# Patient Record
Sex: MALE | Race: WHITE | Employment: FULL TIME | URBAN - METROPOLITAN AREA
[De-identification: names, ages, dates, MRNs, and addresses within clinical notes are randomized per-mention and may not be internally consistent; named-entity substitution may affect disease eponyms.]

---

## 2016-10-17 LAB
CHOLEST SERPL-MCNC: 254 MG/DL (ref 0–199)
CHOLEST/HDLC SERPL: 4.3 {RATIO}
HDLC SERPL-MCNC: 59 MG/DL
LDLC SERPL CALC-MCNC: 163 MG/DL (ref 19–130)
NONHDLC SERPL-MCNC: ABNORMAL MG/DL
TRIGL SERPL-MCNC: 158 MG/DL (ref 4–149)

## 2017-03-20 ENCOUNTER — TRANSFERRED RECORDS (OUTPATIENT)
Dept: CARDIOLOGY | Facility: CLINIC | Age: 62
End: 2017-03-20

## 2017-03-29 ENCOUNTER — TRANSFERRED RECORDS (OUTPATIENT)
Dept: CARDIOLOGY | Facility: CLINIC | Age: 62
End: 2017-03-29

## 2017-03-31 ENCOUNTER — TELEPHONE (OUTPATIENT)
Dept: CARDIOLOGY | Facility: CLINIC | Age: 62
End: 2017-03-31

## 2017-03-31 DIAGNOSIS — I25.10 CORONARY ARTERY DISEASE INVOLVING NATIVE CORONARY ARTERY OF NATIVE HEART WITHOUT ANGINA PECTORIS: Primary | ICD-10-CM

## 2017-03-31 NOTE — TELEPHONE ENCOUNTER
Call back from patient, he found the CT report, CT calcium total=400's. He will fax to nurse fax machine.

## 2017-03-31 NOTE — TELEPHONE ENCOUNTER
Received copy of CT report, copy sent to scan. Per note on report, patient's lipitor was increased to 40mg daily.

## 2017-04-05 ENCOUNTER — TELEPHONE (OUTPATIENT)
Dept: CARDIOLOGY | Facility: CLINIC | Age: 62
End: 2017-04-05

## 2017-04-05 NOTE — TELEPHONE ENCOUNTER
Patient called wondering if there was an appointment available with Dr. House either of 4-12-17 or 4-13-17. Paitent will call back early next week. Dr. House out of the office until then.

## 2017-04-10 PROBLEM — I25.10 CORONARY ARTERY DISEASE INVOLVING NATIVE CORONARY ARTERY OF NATIVE HEART WITHOUT ANGINA PECTORIS: Status: ACTIVE | Noted: 2017-04-10

## 2017-04-10 NOTE — TELEPHONE ENCOUNTER
Spoke with patient, tentatively scheduled for 4/14/17 at 4:30 with Dr. Oconnor, patient is aware he may have to be canceled due to emergent cases in the cath lab.

## 2017-04-10 NOTE — TELEPHONE ENCOUNTER
Contacted patient, he states he will start aspirin 81mg daily. He would like to set up the stress echo while he is here in [place] this week and then hopefully work in to Dr. House's schedule.  Order placed for stress echo and patient connected to scheduling.

## 2017-04-11 ENCOUNTER — TELEPHONE (OUTPATIENT)
Dept: CARDIOLOGY | Facility: CLINIC | Age: 62
End: 2017-04-11

## 2017-04-11 ENCOUNTER — PRE VISIT (OUTPATIENT)
Dept: CARDIOLOGY | Facility: CLINIC | Age: 62
End: 2017-04-11

## 2017-04-11 NOTE — TELEPHONE ENCOUNTER
Patient called, he is scheduled for a stress echo 4-13-17 and a OV with Dr. House 4-14-17. Patient states that due to his kidney stone problems he is on double dose Flomax and percocet frequently.   Patient wonders if either of these medications would affect the results of the stress echocardiogram. Patient states he feels he can walk on the treadmill.   Will update Dr. House.

## 2017-04-12 NOTE — TELEPHONE ENCOUNTER
Received office note 3/21/17 and labs from [facility], labs sent to be entered, records sent to scan

## 2017-04-13 ENCOUNTER — HOSPITAL ENCOUNTER (OUTPATIENT)
Dept: CARDIOLOGY | Facility: CLINIC | Age: 62
Discharge: HOME | End: 2017-04-13
Attending: INTERNAL MEDICINE | Admitting: INTERNAL MEDICINE
Payer: COMMERCIAL

## 2017-04-13 DIAGNOSIS — I25.10 CORONARY ARTERY DISEASE INVOLVING NATIVE CORONARY ARTERY OF NATIVE HEART WITHOUT ANGINA PECTORIS: ICD-10-CM

## 2017-04-13 PROCEDURE — 93350 STRESS TTE ONLY: CPT | Mod: 26 | Performed by: INTERNAL MEDICINE

## 2017-04-13 PROCEDURE — 93016 CV STRESS TEST SUPVJ ONLY: CPT | Performed by: INTERNAL MEDICINE

## 2017-04-13 PROCEDURE — 93018 CV STRESS TEST I&R ONLY: CPT | Performed by: INTERNAL MEDICINE

## 2017-04-13 PROCEDURE — 93321 DOPPLER ECHO F-UP/LMTD STD: CPT | Mod: 26 | Performed by: INTERNAL MEDICINE

## 2017-04-13 PROCEDURE — 93325 DOPPLER ECHO COLOR FLOW MAPG: CPT | Mod: 26 | Performed by: INTERNAL MEDICINE

## 2017-04-13 PROCEDURE — 93325 DOPPLER ECHO COLOR FLOW MAPG: CPT | Mod: TC

## 2017-04-13 NOTE — TELEPHONE ENCOUNTER
Call from Juan at [facility], [phone]. She states patient's insurance will not pay for today's stress test without a [id number] call from Dr. House. Case # [id number], Phone [phone].   Will message Dr. House to review  Juan would like a call back with an update if this call has been made.

## 2017-04-13 NOTE — TELEPHONE ENCOUNTER
Call from patient, he now has a family emergency and needs to go to [facility]. His father and brother have both been admitted for care and his mother is an invalid needing supervision. Patient is hoping he can discuss any urgent matters by phone tomorrow instead of  his planned 4:30 appointment and will be back in [place] next week TWTh if any time there would be open to see Dr. House.  Will message Dr. House to review

## 2017-04-14 DIAGNOSIS — I25.10 CORONARY ARTERY DISEASE INVOLVING NATIVE CORONARY ARTERY OF NATIVE HEART WITHOUT ANGINA PECTORIS: Primary | ICD-10-CM

## 2017-04-14 NOTE — TELEPHONE ENCOUNTER
I called the 888 number. [facility] was approved. I left a mssage on Juan Griffin's message machine

## 2017-04-14 NOTE — TELEPHONE ENCOUNTER
I called Adan. He will be back in the [facility] on Wednesday. I told him I would schedule him for 4PM ov as long as cath lab is not unreasonable. Please try to get Ca Score. Thanks

## 2017-04-19 ENCOUNTER — OFFICE VISIT (OUTPATIENT)
Dept: CARDIOLOGY | Facility: CLINIC | Age: 62
End: 2017-04-19
Attending: INTERNAL MEDICINE
Payer: COMMERCIAL

## 2017-04-19 VITALS
SYSTOLIC BLOOD PRESSURE: 132 MMHG | HEIGHT: 70 IN | WEIGHT: 192.2 LBS | DIASTOLIC BLOOD PRESSURE: 84 MMHG | BODY MASS INDEX: 27.52 KG/M2 | HEART RATE: 75 BPM

## 2017-04-19 DIAGNOSIS — E78.00 PURE HYPERCHOLESTEROLEMIA: Primary | ICD-10-CM

## 2017-04-19 DIAGNOSIS — I25.10 CORONARY ARTERY DISEASE INVOLVING NATIVE CORONARY ARTERY OF NATIVE HEART WITHOUT ANGINA PECTORIS: ICD-10-CM

## 2017-04-19 PROCEDURE — 99203 OFFICE O/P NEW LOW 30 MIN: CPT | Performed by: INTERNAL MEDICINE

## 2017-04-19 PROCEDURE — 93000 ELECTROCARDIOGRAM COMPLETE: CPT | Performed by: INTERNAL MEDICINE

## 2017-04-19 RX ORDER — ROSUVASTATIN CALCIUM 40 MG/1
40 TABLET, COATED ORAL DAILY
Qty: 90 TABLET | Refills: 3 | Status: SHIPPED | OUTPATIENT
Start: 2017-04-19 | End: 2017-06-08

## 2017-04-19 NOTE — LETTER
4/19/2017    ALEKSANDAR JIM  [facility]   [address]  [place] [address]    RE: Zack Alas       Dear Colleague,    I had the pleasure of seeing Zack Alas in the [facility].    Adan is an ex-neighbor of mine.  He actually moved out of my neighborhood in 2003 to [place], [place] but he still comes back for his health care.  He has a very strong family history of coronary artery disease.  He has hypercholesterolemia and recently had a kidney stone.  A CT scan to evaluate his kidney stone noted that he had some calcium building up around his coronaries.  Because of that, he underwent a calcium score through [facility] with a total score of 482 putting him in the 90th-95th percentile for a man of his age.  Most of his calcium was in his left anterior descending artery, right coronary artery and left main.  His doctor recommended a stress echocardiogram to evaluate further and he decided to call me as he knew me from years ago.      Adan has no exertional chest, arm, neck, jaw or shoulder discomfort, no dyspnea on exertion, orthopnea or PND, no palpitations, lightheadedness, dizziness, syncope or near-syncope.  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  He states he has not been doing as well recently but plans to get back with his  and start working out much more vigorously again.  We did perform a stress echocardiogram for which he was able to exercise 9 minutes.  He was asymptomatic.  There were no EKG changes and the echo portion appeared to be normal.  He did have some proximal septal thickening but no dynamic outflow tract gradient.  His baseline EKG demonstrates a normal sinus rhythm.  He has a RSR pattern in V1 but does not appear to have a right bundle branch block.     Outpatient Encounter Prescriptions as of 4/19/2017   Medication Sig Dispense Refill     [DISCONTINUED] rosuvastatin (CRESTOR) 40 MG  tablet Take 1 tablet (40 mg) by mouth daily 90 tablet 3     ASPIRIN ADULT LOW STRENGTH PO Take 81 mg by mouth daily       [DISCONTINUED] Atorvastatin Calcium (LIPITOR PO) Take 40 mg by mouth daily       No facility-administered encounter medications on file as of 4/19/2017.       ASSESSMENT AND PLAN:  Zack appears to be doing well from a cardiac standpoint without clinical evidence of ischemia, heart failure or significant arrhythmia.  Clearly with his calcium score, he does have underlying brewing coronary artery disease but this time none of it appears to be hemodynamically significant.   I talked about the importance of a very aggressive risk factor modification and preventive protocol.  We will start out with 81 mg of aspirin 1 pill once a day.      Fasting lipid profile shows a total cholesterol of 254, triglycerides are 158, HDL is 59, LDL is 163.  He is currently on atorvastatin 40.  I have told him we should just go to the most powerful statin and switched him to rosuvastatin 40.  I would like to recheck a fasting lipid profile and liver function tests in 4-6 weeks.  We talked about potential side effects of the statins and potential benefits of the statins.      Blood pressure right now is very well controlled at 132/84.  Although it is not ideal, I am not going to start him on medications right now.      We talked about the importance of nonmedicinal things to help decrease his risk for vascular disease including daily exercise, predominantly a plant-based diet, high in fresh fruits and vegetables, high fiber, low in red meat and low in dairy products, low in processed food.  He asked about what books to read and I have recommended Naga Forrester's book and Najma's book, How Not To Die.  I will get more recommendations for him.        We will plan on visiting again in about six months.  If he should have any problems, I would be glad to see him sooner.     Again, thank you for allowing me to participate in  the care of your patient.      Sincerely,    Swapnil House MD     [facility]

## 2017-04-19 NOTE — MR AVS SNAPSHOT
After Visit Summary   4/19/2017    Zack Alas    MRN: [id number]           Patient Information     Date Of Birth          1955        Visit Information        Provider Department      4/19/2017 4:00 PM Swapnil House MD [facility]        Today's Diagnoses     Pure hypercholesterolemia    -  1    Coronary artery disease involving native coronary artery of native heart without angina pectoris           Follow-ups after your visit        Additional Services     Follow-Up with Cardiologist                 Future tests that were ordered for you today     Open Future Orders        Priority Expected Expires Ordered    Lipid Profile Routine 10/16/2017 4/19/2018 4/19/2017    Follow-Up with Cardiologist Routine 10/16/2017 4/19/2018 4/19/2017    Lipid Profile Routine 5/19/2017 4/19/2018 4/19/2017    ALT Routine 5/19/2017 4/19/2018 4/19/2017            Who to contact     If you have questions or need follow up information about today's clinic visit or your schedule please contact [facility] directly at [phone].  Normal or non-critical lab and imaging results will be communicated to you by [employer], letter or phone within 4 business days after the clinic has received the results. If you do not hear from us within 7 days, please contact the clinic through N[facility] or phone. If you have a critical or abnormal lab result, we will notify you by phone as soon as possible.  Submit refill requests through [facility] or call your pharmacy and they will forward the refill request to us. Please allow 3 business days for your refill to be completed.          Additional Information About Your Visit        Aunt [employer] Information     [facility] gives you secure access to your electronic health record. If you see a primary care provider, you can also send messages to your care team and make appointments. If you have questions, please  "call your primary care clinic.  If you do not have a primary care provider, please call [phone] and they will assist you.        Care EveryWhere ID     This is your Care EveryWhere ID. This could be used by other organizations to access your [facility] medical records  [id number]        Your Vitals Were     Pulse Height BMI (Body Mass Index)             75 1.778 m (5' 10\") 27.58 kg/m2          Blood Pressure from Last 3 Encounters:   04/19/17 132/84    Weight from Last 3 Encounters:   04/19/17 87.2 kg (192 lb 3.2 oz)              We Performed the Following     EKG 12-lead complete w/read - Clinics (performed today)     Follow-Up with Cardiologist          Today's Medication Changes          These changes are accurate as of: 4/19/17  5:00 PM.  If you have any questions, ask your nurse or doctor.               Start taking these medicines.        Dose/Directions    rosuvastatin 40 MG tablet   Commonly known as:  CRESTOR   Used for:  Coronary artery disease involving native coronary artery of native heart without angina pectoris   Started by:  Swapnil House MD        Dose:  40 mg   Take 1 tablet (40 mg) by mouth daily   Quantity:  90 tablet   Refills:  3         Stop taking these medicines if you haven't already. Please contact your care team if you have questions.     LIPITOR PO   Stopped by:  Swapnil House MD                Where to get your medicines      These medications were sent to [id number] - [place], [place] - [address], [place] [address]     Phone:  [phone]     rosuvastatin 40 MG tablet                Primary Care Provider Office Phone # Fax #    Wei WOODS Hayder [phone]       [facility] [address]  [facility] [place] [facility] [address]        Thank you!     Thank you for choosing [facility]  for your care. Our goal is always to provide you with " excellent care. Hearing back from our patients is one way we can continue to improve our services. Please take a few minutes to complete the written survey that you may receive in the mail after your visit with us. Thank you!             Your Updated Medication List - Protect others around you: Learn how to safely use, store and throw away your medicines at www.disposemymeds.org.          This list is accurate as of: 4/19/17  5:00 PM.  Always use your most recent med list.                   Brand Name Dispense Instructions for use    ASPIRIN ADULT LOW STRENGTH PO      Take 81 mg by mouth daily       rosuvastatin 40 MG tablet    CRESTOR    90 tablet    Take 1 tablet (40 mg) by mouth daily

## 2017-04-19 NOTE — PROGRESS NOTES
HPI and Plan:   See dictation    Orders Placed This Encounter   Procedures     Lipid Profile     ALT     Lipid Profile     Follow-Up with Cardiologist     EKG 12-lead complete w/read - Clinics (performed today)       Orders Placed This Encounter   Medications     rosuvastatin (CRESTOR) 40 MG tablet     Sig: Take 1 tablet (40 mg) by mouth daily     Dispense:  90 tablet     Refill:  3       Medications Discontinued During This Encounter   Medication Reason     Atorvastatin Calcium (LIPITOR PO)          Encounter Diagnoses   Name Primary?     Coronary artery disease involving native coronary artery of native heart without angina pectoris      Pure hypercholesterolemia Yes       CURRENT MEDICATIONS:  Current Outpatient Prescriptions   Medication Sig Dispense Refill     rosuvastatin (CRESTOR) 40 MG tablet Take 1 tablet (40 mg) by mouth daily 90 tablet 3     ASPIRIN ADULT LOW STRENGTH PO Take 81 mg by mouth daily         ALLERGIES   No Known Allergies    PAST MEDICAL HISTORY:  History reviewed. No pertinent past medical history.    PAST SURGICAL HISTORY:  History reviewed. No pertinent surgical history.    FAMILY HISTORY:  Family History   Problem Relation Age of Onset     DIABETES Mother      Chronic Obstructive Pulmonary Disease Mother      Hypertension Father      Prostate Cancer Father      Kidney Cancer Father      Bladder Cancer Father        SOCIAL HISTORY:  Social History     Social History     Marital status:      Spouse name: N/A     Number of children: N/A     Years of education: N/A     Social History Main Topics     Smoking status: Never Smoker     Smokeless tobacco: None     Alcohol use Yes     Drug use: None     Sexual activity: Not Asked     Other Topics Concern     None     Social History Narrative     None       Review of Systems:  Skin:  Negative       Eyes:  Positive for glasses    ENT:  Negative      Respiratory:  Negative       Cardiovascular:  Negative      Gastroenterology: Negative     "  Genitourinary:  not assessed      Musculoskeletal:  Negative      Neurologic:  Negative      Psychiatric:  Negative      Heme/Lymph/Imm:  Negative      Endocrine:  Negative        Physical Exam:  Vitals: /84  Pulse 75  Ht 1.778 m (5' 10\")  Wt 87.2 kg (192 lb 3.2 oz)  BMI 27.58 kg/m2    Constitutional:  cooperative, alert and oriented, well developed, well nourished, in no acute distress        Skin:  warm and dry to the touch, no apparent skin lesions or masses noted        Head:  normocephalic, no masses or lesions        Eyes:  pupils equal and round, conjunctivae and lids unremarkable, sclera white, no xanthalasma, EOMS intact, no nystagmus        ENT:  no pallor or cyanosis, dentition good        Neck:  carotid pulses are full and equal bilaterally;no carotid bruit        Chest:             Cardiac: regular rhythm;normal S1 and S2;no S3 or S4;no murmurs, gallops or rubs detected                  Abdomen:           Vascular: pulses full and equal                                        Extremities and Back:  no edema;no spinal abnormalities noted;normal muscle strength and tone              Neurological:  affect appropriate, oriented to time, person and place;no gross motor deficits              CC  Swapnil House MD  [facility]  [address]  [place] [address]              "

## 2017-04-20 NOTE — PROGRESS NOTES
HISTORY OF PRESENT ILLNESS:  Adan is an ex-neighbor of mine.  He actually moved out of my neighborhood in 2003 to [place], [place] but he still comes back for his health care.  He has a very strong family history of coronary artery disease.  He has hypercholesterolemia and recently had a kidney stone.  A CT scan to evaluate his kidney stone noted that he had some calcium building up around his coronaries.  Because of that, he underwent a calcium score through [facility] with a total score of 482 putting him in the 90th-95th percentile for a man of his age.  Most of his calcium was in his left anterior descending artery, right coronary artery and left main.  His doctor recommended a stress echocardiogram to evaluate further and he decided to call me as he knew me from years ago.      Adan has no exertional chest, arm, neck, jaw or shoulder discomfort, no dyspnea on exertion, orthopnea or PND, no palpitations, lightheadedness, dizziness, syncope or near-syncope.  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  He states he has not been doing as well recently but plans to get back with his  and start working out much more vigorously again.  We did perform a stress echocardiogram for which he was able to exercise 9 minutes.  He was asymptomatic.  There were no EKG changes and the echo portion appeared to be normal.  He did have some proximal septal thickening but no dynamic outflow tract gradient.  His baseline EKG demonstrates a normal sinus rhythm.  He has a RSR pattern in V1 but does not appear to have a right bundle branch block.      ASSESSMENT AND PLAN:  Zack appears to be doing well from a cardiac standpoint without clinical evidence of ischemia, heart failure or significant arrhythmia.  Clearly with his calcium score, he does have underlying brewing coronary artery disease but this time none of it appears to be hemodynamically significant.   I talked about the importance of a  very aggressive risk factor modification and preventive protocol.  We will start out with 81 mg of aspirin 1 pill once a day.      Fasting lipid profile shows a total cholesterol of 254, triglycerides are 158, HDL is 59, LDL is 163.  He is currently on atorvastatin 40.  I have told him we should just go to the most powerful statin and switched him to rosuvastatin 40.  I would like to recheck a fasting lipid profile and liver function tests in 4-6 weeks.  We talked about potential side effects of the statins and potential benefits of the statins.      Blood pressure right now is very well controlled at 132/84.  Although it is not ideal, I am not going to start him on medications right now.      We talked about the importance of nonmedicinal things to help decrease his risk for vascular disease including daily exercise, predominantly a plant-based diet, high in fresh fruits and vegetables, high fiber, low in red meat and low in dairy products, low in processed food.  He asked about what books to read and I have recommended Naga Forrester's book and Najma's book, How Not To Die.  I will get more recommendations for him.        We will plan on visiting again in about six months.  If he should have any problems, I would be glad to see him sooner.         MIKE YOUNGBLOOD MD, [facility]             D: 2017 17:00   T: 2017 14:35   MT: CATHIE      Name:     REINIER AN   MRN:      [id number]        Account:      [id number]   :      1955           Service Date: 2017      Document: [id number]

## 2017-05-25 ENCOUNTER — TELEPHONE (OUTPATIENT)
Dept: CARDIOLOGY | Facility: CLINIC | Age: 62
End: 2017-05-25

## 2017-05-25 DIAGNOSIS — I25.10 CORONARY ARTERY DISEASE INVOLVING NATIVE CORONARY ARTERY OF NATIVE HEART WITHOUT ANGINA PECTORIS: ICD-10-CM

## 2017-05-25 LAB
ALT SERPL W P-5'-P-CCNC: 13 U/L (ref 5–30)
CHOLEST SERPL-MCNC: 145 MG/DL
HDLC SERPL-MCNC: 60 MG/DL
LDLC SERPL CALC-MCNC: 64 MG/DL
NONHDLC SERPL-MCNC: 85 MG/DL
TRIGL SERPL-MCNC: 107 MG/DL

## 2017-05-25 PROCEDURE — 36415 COLL VENOUS BLD VENIPUNCTURE: CPT | Performed by: INTERNAL MEDICINE

## 2017-05-25 PROCEDURE — 80061 LIPID PANEL: CPT | Performed by: INTERNAL MEDICINE

## 2017-05-25 PROCEDURE — 84460 ALANINE AMINO (ALT) (SGPT): CPT | Performed by: INTERNAL MEDICINE

## 2017-05-25 NOTE — TELEPHONE ENCOUNTER
Lipids/ALT done 5-215-17 - 5 week recheck after changing to Crestor 40 mg daily.   Lab Results   Component Value Date    CHOL 145 05/25/2017     Lab Results   Component Value Date    HDL 60 05/25/2017     Lab Results   Component Value Date    LDL 64 05/25/2017     Lab Results   Component Value Date    TRIG 107 05/25/2017   ALT   < 5

## 2017-05-25 NOTE — LETTER
May 25, 2017       TO: Zack Alas   [address]  [address]  [place] [address]       Dear Mr. Alas,    The results of your recent cholesterol labs were reviewed by Dr. House. Results look very good after changing to Rosuvastatin. Continue with mediations, diet and exercise.     Results for orders placed or performed in visit on 05/25/17   Lipid Profile   Result Value Ref Range    Cholesterol 145 <200 mg/dL    Triglycerides 107 <150 mg/dL    HDL Cholesterol 60 >39 mg/dL    LDL Cholesterol Calculated 64 <100 mg/dL    Non HDL Cholesterol 85 <130 mg/dL   ALT   Result Value Ref Range    ALT 13 5 - 30 U/L         Sincerely,    [facility]

## 2017-06-08 DIAGNOSIS — I25.10 CORONARY ARTERY DISEASE INVOLVING NATIVE CORONARY ARTERY OF NATIVE HEART WITHOUT ANGINA PECTORIS: ICD-10-CM

## 2017-06-08 RX ORDER — ROSUVASTATIN CALCIUM 40 MG/1
40 TABLET, COATED ORAL DAILY
Qty: 90 TABLET | Refills: 3 | Status: SHIPPED | OUTPATIENT
Start: 2017-06-08 | End: 2017-10-31

## 2017-09-25 ENCOUNTER — TRANSFERRED RECORDS (OUTPATIENT)
Dept: HEALTH INFORMATION MANAGEMENT | Facility: CLINIC | Age: 62
End: 2017-09-25

## 2017-10-31 ENCOUNTER — MYC REFILL (OUTPATIENT)
Dept: CARDIOLOGY | Facility: CLINIC | Age: 62
End: 2017-10-31

## 2017-10-31 DIAGNOSIS — I25.10 CORONARY ARTERY DISEASE INVOLVING NATIVE CORONARY ARTERY OF NATIVE HEART WITHOUT ANGINA PECTORIS: ICD-10-CM

## 2017-11-01 RX ORDER — ROSUVASTATIN CALCIUM 40 MG/1
40 TABLET, COATED ORAL DAILY
Qty: 90 TABLET | Refills: 0 | Status: SHIPPED | OUTPATIENT
Start: 2017-11-01 | End: 2018-02-27

## 2017-11-01 NOTE — TELEPHONE ENCOUNTER
My Chart message from patient: Zack Alas would like a refill of the following medications:   rosuvastatin (CRESTOR) 40 MG tablet [Swapnil House MD]   Preferred pharmacy: Other - [facility], [address] at [address]   Comment:   I'm needing to change my pharmacy for insurance reasons.  Could you please send a script to [facility] at [address] in [place], [place].  Please set it up for a 90 day refill.   I'd like to pick this up no later than Thursday morning if possible    Rx escripted for crestor 40mg for #90 no refills to new pharmacy as requested. Message back to patient that Dr. House wanted to see him in October for follow up.

## 2017-11-01 NOTE — TELEPHONE ENCOUNTER
Message from [employer]:  Original authorizing provider: Swapnil House MD    Zack Alas would like a refill of the following medications:  rosuvastatin (CRESTOR) 40 MG tablet [Swapnil House MD]    Preferred pharmacy: Other - [facility], [address] at [address]    Comment:  I'm needing to change my pharmacy for insurance reasons. Could you please send a script to [facility] at [address] in [place], [place]. Please set it up for a 90 day refill. I'd like to pick this up no later than Thursday morning if possible. Thank you.

## 2017-11-21 ENCOUNTER — TELEPHONE (OUTPATIENT)
Dept: CARDIOLOGY | Facility: CLINIC | Age: 62
End: 2017-11-21

## 2018-02-27 ENCOUNTER — MYC MEDICAL ADVICE (OUTPATIENT)
Dept: CARDIOLOGY | Facility: CLINIC | Age: 63
End: 2018-02-27

## 2018-02-27 DIAGNOSIS — I25.10 CORONARY ARTERY DISEASE INVOLVING NATIVE CORONARY ARTERY OF NATIVE HEART WITHOUT ANGINA PECTORIS: ICD-10-CM

## 2018-02-27 RX ORDER — ROSUVASTATIN CALCIUM 40 MG/1
40 TABLET, COATED ORAL DAILY
Qty: 90 TABLET | Refills: 0 | Status: SHIPPED | OUTPATIENT
Start: 2018-02-27 | End: 2018-06-06

## 2018-02-27 NOTE — TELEPHONE ENCOUNTER
My Chart message 2-27-18  My current prescription of Rosuvastatin 40mg will run out in the next week and I'm traveling in [place].  My prescription was filled at [facility] in [facility] previously.  Could you send a prescription to [email or web address] [id number], [address] [place], [place], [phone] for me to  this week?  Re fill e scribed.   Annual OV 4-18-18 with Dr. House.   Labs due.

## 2018-03-20 ENCOUNTER — TELEPHONE (OUTPATIENT)
Dept: CARDIOLOGY | Facility: CLINIC | Age: 63
End: 2018-03-20

## 2018-03-20 NOTE — TELEPHONE ENCOUNTER
Call from patient, who is requesting an appointment with Dr. House for the week of April 30th, as this is one of the only weeks he will be in town, as he travels extensively for work. Will message Dr. House for review.

## 2018-03-21 NOTE — TELEPHONE ENCOUNTER
Spoke with patient to offer May 2 @ 10:15, scheduled labs on 4/26/18 at 11:40.  Message to scheduling supervisor to add patient to locked spot.

## 2018-04-11 ENCOUNTER — PRE VISIT (OUTPATIENT)
Dept: CARDIOLOGY | Facility: CLINIC | Age: 63
End: 2018-04-11

## 2018-04-11 DIAGNOSIS — E78.2 MIXED HYPERLIPIDEMIA: Primary | ICD-10-CM

## 2018-04-26 DIAGNOSIS — E78.2 MIXED HYPERLIPIDEMIA: ICD-10-CM

## 2018-04-26 LAB
CHOLEST SERPL-MCNC: 141 MG/DL
HDLC SERPL-MCNC: 53 MG/DL
LDLC SERPL CALC-MCNC: 69 MG/DL
NONHDLC SERPL-MCNC: 88 MG/DL
TRIGL SERPL-MCNC: 95 MG/DL

## 2018-04-26 PROCEDURE — 36415 COLL VENOUS BLD VENIPUNCTURE: CPT | Performed by: INTERNAL MEDICINE

## 2018-04-26 PROCEDURE — 80061 LIPID PANEL: CPT | Performed by: INTERNAL MEDICINE

## 2018-05-02 ENCOUNTER — OFFICE VISIT (OUTPATIENT)
Dept: CARDIOLOGY | Facility: CLINIC | Age: 63
End: 2018-05-02
Payer: COMMERCIAL

## 2018-05-02 ENCOUNTER — DOCUMENTATION ONLY (OUTPATIENT)
Dept: CARDIOLOGY | Facility: CLINIC | Age: 63
End: 2018-05-02

## 2018-05-02 VITALS
BODY MASS INDEX: 27.97 KG/M2 | HEART RATE: 72 BPM | DIASTOLIC BLOOD PRESSURE: 82 MMHG | SYSTOLIC BLOOD PRESSURE: 130 MMHG | HEIGHT: 70 IN | WEIGHT: 195.4 LBS

## 2018-05-02 DIAGNOSIS — R93.1 HIGH CORONARY ARTERY CALCIUM SCORE: Chronic | ICD-10-CM

## 2018-05-02 DIAGNOSIS — E78.00 PURE HYPERCHOLESTEROLEMIA: Primary | ICD-10-CM

## 2018-05-02 PROBLEM — I25.10 CORONARY ARTERY DISEASE INVOLVING NATIVE CORONARY ARTERY OF NATIVE HEART WITHOUT ANGINA PECTORIS: Status: RESOLVED | Noted: 2017-04-10 | Resolved: 2018-05-02

## 2018-05-02 PROCEDURE — 99213 OFFICE O/P EST LOW 20 MIN: CPT | Performed by: INTERNAL MEDICINE

## 2018-05-02 RX ORDER — CEPHALEXIN 500 MG/1
500 CAPSULE ORAL 3 TIMES DAILY
COMMUNITY

## 2018-05-02 NOTE — LETTER
5/2/2018    ALEKSANDAR RINCONMAN  [facility] [address]  [place] [address]    RE: Zack Alas       Dear Colleague,    I had the pleasure of seeing Zack Alas in the [facility].    HPI and Plan:   See dictation    Orders Placed This Encounter   Procedures     Lipid Profile     ALT     Follow-Up with Cardiologist     Echocardiogram     Exercise Stress Echocardiogram       Orders Placed This Encounter   Medications     cephALEXin (KEFLEX) 500 MG capsule     Sig: Take 500 mg by mouth 3 times daily       There are no discontinued medications.      Encounter Diagnoses   Name Primary?     Pure hypercholesterolemia Yes     High coronary artery calcium score        CURRENT MEDICATIONS:  Current Outpatient Prescriptions   Medication Sig Dispense Refill     ASPIRIN ADULT LOW STRENGTH PO Take 81 mg by mouth daily       cephALEXin (KEFLEX) 500 MG capsule Take 500 mg by mouth 3 times daily       rosuvastatin (CRESTOR) 40 MG tablet Take 1 tablet (40 mg) by mouth daily 90 tablet 0       ALLERGIES   No Known Allergies    PAST MEDICAL HISTORY:  History reviewed. No pertinent past medical history.    PAST SURGICAL HISTORY:  History reviewed. No pertinent surgical history.    FAMILY HISTORY:  Family History   Problem Relation Age of Onset     DIABETES Mother      Chronic Obstructive Pulmonary Disease Mother      Hypertension Father      Prostate Cancer Father      Kidney Cancer Father      Bladder Cancer Father        SOCIAL HISTORY:  Social History     Social History     Marital status:      Spouse name: N/A     Number of children: N/A     Years of education: N/A     Social History Main Topics     Smoking status: Never Smoker     Smokeless tobacco: Never Used     Alcohol use Yes      Comment: couple cocktails a week     Drug use: None     Sexual activity: Not Asked     Other Topics Concern     None     Social History Narrative       Review of Systems:  Skin:   "Negative       Eyes:  Negative      ENT:  Negative      Respiratory:  Positive for dyspnea on exertion     Cardiovascular:  Negative      Gastroenterology: Negative      Genitourinary:  Negative      Musculoskeletal:  Negative      Neurologic:  Negative      Psychiatric:  Negative      Heme/Lymph/Imm:  Negative      Endocrine:  Negative        Physical Exam:  Vitals: /82  Pulse 72  Ht 1.778 m (5' 10\")  Wt 88.6 kg (195 lb 6.4 oz)  BMI 28.04 kg/m2    Constitutional:  cooperative, alert and oriented, well developed, well nourished, in no acute distress        Skin:  warm and dry to the touch, no apparent skin lesions or masses noted          Head:  normocephalic, no masses or lesions        Eyes:  pupils equal and round, conjunctivae and lids unremarkable, sclera white, no xanthalasma, EOMS intact, no nystagmus        Lymph:      ENT:  no pallor or cyanosis, dentition good        Neck:  carotid pulses are full and equal bilaterally;no carotid bruit        Respiratory:            Cardiac: regular rhythm;normal S1 and S2;no S3 or S4;no murmurs, gallops or rubs detected                pulses full and equal                                        GI:           Extremities and Muscular Skeletal:  no edema;no spinal abnormalities noted;normal muscle strength and tone              Neurological:  no gross motor deficits        Psych:  affect appropriate, oriented to time, person and place        CC  Wei WOODS Hayderman [facility] [address] [place] [address]                Thank you for allowing me to participate in the care of your patient.      Sincerely,     Swapnil House MD     [facility]    cc:   Wei WOODS Hayder  [facility] [address] [place] [address]        "

## 2018-05-02 NOTE — PROGRESS NOTES
Service Date: 05/02/2018      HISTORY OF PRESENT ILLNESS:  Adan is a very nice 62-year-old gentleman who is an ex-neighbor of mine.  He moved out of the neighborhood to [place], [place] in 2003, but still comes back for his health care.  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        He has a very strong family history of coronary artery disease.  He has hypercholesterolemia.  A kidney stone led to a CT scan demonstrating a lot of calcium buildup around his arteries.  A calcium score came back at 482, putting him in the 90%- 95th percentile for a man of his age.  Most of the calcium was in the left anterior descending artery, right coronary artery and left main.  He subsequently underwent a stress echo which appeared to demonstrate no evidence of ischemia.      Adan returns to clinic stating he thinks he is doing quite well.  He has no exertional chest, arm, neck, jaw or shoulder discomfort.  No dyspnea on exertion, orthopnea or PND.  No palpitations, lightheadedness, dizziness, syncope or near-syncope.  He is planning on starting a very aggressive training program as he wants to do a 100-mile ride in the [employer] and so he is concerned about how hard he can push himself, especially at high elevations.  He currently states he tries to exercise about 7 times a week but has not been doing any intensive biking.  He is started on a cleanse and a special diet.  He notes no side effects or problems with his current medical regimen.      ASSESSMENT AND PLAN:  Adan appears to be well from a cardiac standpoint without clinical evidence of ischemia and this is supported by his stress test of 2017.  Given the fact that he is going to start an intensive training program and is concerned about doing this at high elevation with his very high calcium score, we will run a repeat  stress test this year, just to make sure it is safe to do so and I will set him up for a stress echocardiogram.      I congratulated him on his exercise regimen and encouraged him to continue to do aerobic activity at least 5 times a week and resistance activity 2-3 times a week.      We talked about the importance of a diet that is high in fresh fruits and vegetables, high fiber, whole grain, low in red meat, low in dairy products.      Fasting lipid profile this year is still good with a total cholesterol of 141, HDL of 53, LDL of 69, triglycerides of 95.  Again, I have told him if he exercises consistently, lose 5-10 pounds and tighten up his diet, his cholesterol profile will continue to get better.  We will continue with the Crestor 40 mg daily.  He also is on a baby dose aspirin once a day.      Blood pressure is well controlled at 130/82.  Weight is 195 pounds, giving a body mass index of 28.1.      We will follow up on the stress test.  Otherwise, I will plan on seeing her back in 1 year.  If he should have any problems, I will be glad to see him sooner.      He states his sister has found some genetic testing and I told him if he is interested in this, we will set him up with Cara Norris for some genetic counseling; at this time he is not interested in pursuing this.      Thank you for allowing me to participate in his care.         MIKE YOUNGBLOOD MD, [facility]             D: 2018   T: 2018   MT: NASH      Name:     REINIER AN   MRN:      [id number]        Account:      [id number]   :      1955           Service Date: 2018      Document: [id number]

## 2018-05-02 NOTE — MR AVS SNAPSHOT
After Visit Summary   5/2/2018    Zack Alas    MRN: [id number]           Patient Information     Date Of Birth          1955        Visit Information        Provider Department      5/2/2018 10:15 AM Swapnil House MD [facility]        Today's Diagnoses     Pure hypercholesterolemia    -  1    High coronary artery calcium score           Follow-ups after your visit        Additional Services     Follow-Up with Cardiologist                 Future tests that were ordered for you today     Open Future Orders        Priority Expected Expires Ordered    Lipid Profile Routine 5/2/2019 5/2/2019 5/2/2018    ALT Routine 5/2/2019 5/2/2019 5/2/2018    Follow-Up with Cardiologist Routine 5/2/2019 5/3/2019 5/2/2018    Exercise Stress Echocardiogram Routine 5/9/2018 5/2/2019 5/2/2018            Who to contact     If you have questions or need follow up information about today's clinic visit or your schedule please contact [facility] directly at [phone].  Normal or non-critical lab and imaging results will be communicated to you by [employer], letter or phone within 4 business days after the clinic has received the results. If you do not hear from us within 7 days, please contact the clinic through [employer] or phone. If you have a critical or abnormal lab result, we will notify you by phone as soon as possible.  Submit refill requests through [employer] or call your pharmacy and they will forward the refill request to us. Please allow 3 business days for your refill to be completed.          Additional Information About Your Visit        [employer] Information     [employer] gives you secure access to your electronic health record. If you see a primary care provider, you can also send messages to your care team and make appointments. If you have questions, please call your primary care clinic.  If you do not have a primary care  "provider, please call [phone] and they will assist you.        Care EveryWhere ID     This is your Care EveryWhere ID. This could be used by other organizations to access your [place] medical records  [id number]        Your Vitals Were     Pulse Height BMI (Body Mass Index)             72 1.778 m (5' 10\") 28.04 kg/m2          Blood Pressure from Last 3 Encounters:   05/02/18 130/82   04/19/17 132/84    Weight from Last 3 Encounters:   05/02/18 88.6 kg (195 lb 6.4 oz)   04/19/17 87.2 kg (192 lb 3.2 oz)               Primary Care Provider Office Phone # Fax #    Wei Singletary [phone]       [facility] [address] [place] [phone]        Equal Access to Services     NEFTALI PULIDO : Hadii aad ku hadasho Soomaali, waaxda luqadaha, qaybta kaalmada adeegyada, waxay idiin hayaan axel kharamurphy gallegos . So [facility] [phone].    ATENCIÓN: Si habla español, tiene a moreno disposición servicios gratuitos de asistencia lingüística. Hanna al [phone].    We comply with applicable federal civil rights laws and [place] laws. We do not discriminate on the basis of race, color, national origin, age, disability, sex, sexual orientation, or gender identity.            Thank you!     Thank you for choosing [facility]  for your care. Our goal is always to provide you with excellent care. Hearing back from our patients is one way we can continue to improve our services. Please take a few minutes to complete the written survey that you may receive in the mail after your visit with us. Thank you!             Your Updated Medication List - Protect others around you: Learn how to safely use, store and throw away your medicines at www.disposemymeds.org.          This list is accurate as of 5/2/18 11:22 AM.  Always use your most recent med list.                   Brand Name Dispense Instructions for use Diagnosis    ASPIRIN ADULT LOW STRENGTH " PO      Take 81 mg by mouth daily        cephALEXin 500 MG capsule    KEFLEX     Take 500 mg by mouth 3 times daily        rosuvastatin 40 MG tablet    CRESTOR    90 tablet    Take 1 tablet (40 mg) by mouth daily    Coronary artery disease involving native coronary artery of native heart without angina pectoris

## 2018-05-02 NOTE — PROGRESS NOTES
HPI and Plan:   See dictation    Orders Placed This Encounter   Procedures     Lipid Profile     ALT     Follow-Up with Cardiologist     Echocardiogram     Exercise Stress Echocardiogram       Orders Placed This Encounter   Medications     cephALEXin (KEFLEX) 500 MG capsule     Sig: Take 500 mg by mouth 3 times daily       There are no discontinued medications.      Encounter Diagnoses   Name Primary?     Pure hypercholesterolemia Yes     High coronary artery calcium score        CURRENT MEDICATIONS:  Current Outpatient Prescriptions   Medication Sig Dispense Refill     ASPIRIN ADULT LOW STRENGTH PO Take 81 mg by mouth daily       cephALEXin (KEFLEX) 500 MG capsule Take 500 mg by mouth 3 times daily       rosuvastatin (CRESTOR) 40 MG tablet Take 1 tablet (40 mg) by mouth daily 90 tablet 0       ALLERGIES   No Known Allergies    PAST MEDICAL HISTORY:  History reviewed. No pertinent past medical history.    PAST SURGICAL HISTORY:  History reviewed. No pertinent surgical history.    FAMILY HISTORY:  Family History   Problem Relation Age of Onset     DIABETES Mother      Chronic Obstructive Pulmonary Disease Mother      Hypertension Father      Prostate Cancer Father      Kidney Cancer Father      Bladder Cancer Father        SOCIAL HISTORY:  Social History     Social History     Marital status:      Spouse name: N/A     Number of children: N/A     Years of education: N/A     Social History Main Topics     Smoking status: Never Smoker     Smokeless tobacco: Never Used     Alcohol use Yes      Comment: couple cocktails a week     Drug use: None     Sexual activity: Not Asked     Other Topics Concern     None     Social History Narrative       Review of Systems:  Skin:  Negative       Eyes:  Negative      ENT:  Negative      Respiratory:  Positive for dyspnea on exertion     Cardiovascular:  Negative      Gastroenterology: Negative      Genitourinary:  Negative      Musculoskeletal:  Negative      Neurologic:   "Negative      Psychiatric:  Negative      Heme/Lymph/Imm:  Negative      Endocrine:  Negative        Physical Exam:  Vitals: /82  Pulse 72  Ht 1.778 m (5' 10\")  Wt 88.6 kg (195 lb 6.4 oz)  BMI 28.04 kg/m2    Constitutional:  cooperative, alert and oriented, well developed, well nourished, in no acute distress        Skin:  warm and dry to the touch, no apparent skin lesions or masses noted          Head:  normocephalic, no masses or lesions        Eyes:  pupils equal and round, conjunctivae and lids unremarkable, sclera white, no xanthalasma, EOMS intact, no nystagmus        Lymph:      ENT:  no pallor or cyanosis, dentition good        Neck:  carotid pulses are full and equal bilaterally;no carotid bruit        Respiratory:            Cardiac: regular rhythm;normal S1 and S2;no S3 or S4;no murmurs, gallops or rubs detected                pulses full and equal                                        GI:           Extremities and Muscular Skeletal:  no edema;no spinal abnormalities noted;normal muscle strength and tone              Neurological:  no gross motor deficits        Psych:  affect appropriate, oriented to time, person and place        CC  Wei Singletary  [facility]  [address] [address]              "

## 2018-05-09 ENCOUNTER — HOSPITAL ENCOUNTER (OUTPATIENT)
Dept: CARDIOLOGY | Facility: CLINIC | Age: 63
Discharge: HOME | End: 2018-05-09
Attending: INTERNAL MEDICINE | Admitting: INTERNAL MEDICINE
Payer: COMMERCIAL

## 2018-05-09 ENCOUNTER — TELEPHONE (OUTPATIENT)
Dept: CARDIOLOGY | Facility: CLINIC | Age: 63
End: 2018-05-09

## 2018-05-09 DIAGNOSIS — R93.1 HIGH CORONARY ARTERY CALCIUM SCORE: Chronic | ICD-10-CM

## 2018-05-09 PROCEDURE — 93321 DOPPLER ECHO F-UP/LMTD STD: CPT | Mod: TC

## 2018-05-09 PROCEDURE — 93018 CV STRESS TEST I&R ONLY: CPT | Performed by: INTERNAL MEDICINE

## 2018-05-09 PROCEDURE — 93325 DOPPLER ECHO COLOR FLOW MAPG: CPT | Mod: 26 | Performed by: INTERNAL MEDICINE

## 2018-05-09 PROCEDURE — 93016 CV STRESS TEST SUPVJ ONLY: CPT | Performed by: INTERNAL MEDICINE

## 2018-05-09 PROCEDURE — 93350 STRESS TTE ONLY: CPT | Mod: 26 | Performed by: INTERNAL MEDICINE

## 2018-05-09 PROCEDURE — 93321 DOPPLER ECHO F-UP/LMTD STD: CPT | Mod: 26 | Performed by: INTERNAL MEDICINE

## 2018-05-09 NOTE — TELEPHONE ENCOUNTER
Stress echo 5-9-18. Last OV 5-2-18 with Dr. House.   Target Heart Rate was achieved.  The EKG portion of this stress test was negative for inducible ischemia (see  echo results below).  This was a normal stress echocardiogram with no evidence of stress-induced  Ischemia  [place]: Dr. Satish House to review.

## 2018-05-21 NOTE — TELEPHONE ENCOUNTER
Attempted to contact patient with Stress Echo results. Message left of normal stress echo.  Test results available in My Chart.  Message left if after patient reviewed test in My chart or with any questions to please call back. To discuss.

## 2018-05-26 ENCOUNTER — MYC MEDICAL ADVICE (OUTPATIENT)
Dept: CARDIOLOGY | Facility: CLINIC | Age: 63
End: 2018-05-26

## 2018-05-29 ENCOUNTER — MYC MEDICAL ADVICE (OUTPATIENT)
Dept: CARDIOLOGY | Facility: CLINIC | Age: 63
End: 2018-05-29

## 2018-05-30 ENCOUNTER — MYC MEDICAL ADVICE (OUTPATIENT)
Dept: CARDIOLOGY | Facility: CLINIC | Age: 63
End: 2018-05-30

## 2018-05-30 NOTE — TELEPHONE ENCOUNTER
"MyChart message from patient:    Last fall when I came in for my prior stress there was some concern about blockage, there was a discussion of using meds or a stent.  We agreed to go the meds route initially which we did.  Should I then assume, the blockage that was seen last fall has gone away now?    Patient had a CT calcium score last year (in Care Everywhere) that indicated patient was in the 90-95 percentile for his age group and there is some calcification in his arteries. Patient has not had any other tests indicating blockages. Per Dr. House's dictation from 5/2/18, \" A calcium score came back at 482, putting him in the 90%- 95th percentile for a man of his age.  Most of the calcium was in the left anterior descending artery, right coronary artery and left main.  He subsequently underwent a stress echo which appeared to demonstrate no evidence of ischemia.\" A repeat stress echo was done per Dr. House after OV on 5/2/18: \"Given the fact that he is going to start an intensive training program and is concerned about doing this at high elevation with his very high calcium score, we will run a repeat stress test this year, just to make sure it is safe to do so and I will set him up for a stress echocardiogram.\" Stress echo performed on 5/9/18, which showed no evidence of ischemia. Dr. House's response to patient's stress echo was \"Very good relay to Patient\" (see encounter from 5/9/18). Unable to contact patient after multiple attempts, so a [employer] message was sent to the patient reviewing results.     Attempted to contact patient to review [employer] message and explain a response to his concern, patient did not answer. Left message for patient to call back.     Spoke with patient. Reviewed in detail with patient what the different tests he had done look for and the results. Reviewed with patient that his CT calcium score looks at his coronary arteries and looks for any calcification in the those " arteries. Reviewed with patient that he did have some calcification in his arteries, but does not mean there is a blockage necessarily. Then reviewed with patient that the stress echocardiogram looks at the heart under stress to look for any ischemia, which was explained to the patient as lack of blood flow that may suggest blockage. Reviewed with patient that his stress echo, both this year and last year, did not show any evidence of ischemia. Patient verbalized understanding and thanked RN for the explanation. Reviewed with patient that the preventative management (medications, diet, and exercise) will help reduce the chances of the patient developing a blockage. Patient verbalized understanding and agreed with plan of care.

## 2018-06-06 ENCOUNTER — MYC MEDICAL ADVICE (OUTPATIENT)
Dept: CARDIOLOGY | Facility: CLINIC | Age: 63
End: 2018-06-06

## 2018-06-06 DIAGNOSIS — I25.10 CORONARY ARTERY DISEASE INVOLVING NATIVE CORONARY ARTERY OF NATIVE HEART WITHOUT ANGINA PECTORIS: ICD-10-CM

## 2018-06-06 DIAGNOSIS — E78.2 MIXED HYPERLIPIDEMIA: Primary | ICD-10-CM

## 2018-06-06 RX ORDER — ROSUVASTATIN CALCIUM 40 MG/1
40 TABLET, COATED ORAL DAILY
Qty: 90 TABLET | Refills: 2 | Status: SHIPPED | OUTPATIENT
Start: 2018-06-06 | End: 2018-10-24

## 2018-10-24 DIAGNOSIS — I25.10 CORONARY ARTERY DISEASE INVOLVING NATIVE CORONARY ARTERY OF NATIVE HEART WITHOUT ANGINA PECTORIS: ICD-10-CM

## 2018-10-24 DIAGNOSIS — E78.2 MIXED HYPERLIPIDEMIA: ICD-10-CM

## 2018-10-24 RX ORDER — ROSUVASTATIN CALCIUM 40 MG/1
40 TABLET, COATED ORAL DAILY
Qty: 90 TABLET | Refills: 3 | Status: SHIPPED | OUTPATIENT
Start: 2018-10-24 | End: 2020-05-27

## 2019-05-09 ENCOUNTER — DOCUMENTATION ONLY (OUTPATIENT)
Dept: CARDIOLOGY | Facility: CLINIC | Age: 64
End: 2019-05-09

## 2019-05-09 NOTE — LETTER
May 9, 2019       TO: Zack Alas   [facility]  [place] [address]       Dear Zack Alas,    We are reviewing our outstanding orders.    Dr. House has ordered an office visit and lab work for your annual follow up. Your last visit was in May, 2018. Dr. House has some openings in August.     Please contact the scheduling desk at [phone] to arrange for an appointment.     If you have any questions, please call the Team 2 nurse phone @ [phone]. If you had your lab work done at another facility outside of the [facility] system, please give us a call so we can locate the results.     Thank you  Team 2 nurses

## 2019-06-11 ENCOUNTER — TELEPHONE (OUTPATIENT)
Dept: CARDIOLOGY | Facility: CLINIC | Age: 64
End: 2019-06-11

## 2019-06-11 NOTE — TELEPHONE ENCOUNTER
Unable to schedule return OV 9-9-19 as 10:30 spot is DOD. Patient scheduled 9-11-19 into locked spot.   Patient agrees with day, time and place  Patient aware that labs are needed and will come 1 hr early for labs.

## 2019-06-11 NOTE — TELEPHONE ENCOUNTER
Patient called, lives out of town but will be in [place] on Sept 9, 10, 11,  And was hoping for a work in annual  OV with Dr. House .   Will message Dr. House.

## 2019-10-02 ENCOUNTER — HEALTH MAINTENANCE LETTER (OUTPATIENT)
Age: 64
End: 2019-10-02

## 2019-11-01 LAB
CHOLEST SERPL-MCNC: 247 MG/DL
HDLC SERPL-MCNC: 56 MG/DL
LDLC SERPL CALC-MCNC: 168 MG/DL
NONHDLC SERPL-MCNC: NORMAL MG/DL
TRIGL SERPL-MCNC: 114 MG/DL

## 2020-01-02 NOTE — LETTER
"CC: Here for routine prenatal visit   38 year old y/o  @ 18w6d with Estimated Date of Delivery: May 29, 2020     /80   Ht 1.588 m (5' 2.5\")   Wt 101.6 kg (224 lb)   LMP 2019 (Exact Date)   BMI 40.32 kg/m    See OB flowsheet  + fetal movement, no contractions, no bleeding, no loss of fluid   Discussed monitoring fetal movement     1) concerns: us results, marginal cord insertion  2) Routine: innatal and preparent normal, boy, A-, RI   3) Risk factors:   A: Hx of CS with : desires    B: BMI 39.44: MFM us   C: AMA:  MFM us    D: Hx of GDM will test @ 20 weeks and 28 weeks    E: marginal cord insertion: repeat us @ 28 weeks    F: GERD: rx prilosec    4) Return: 4 weeks     Tillleonardas    " 5/2/2018      ALEKSANDAR RINCONMAN  [facility] [address]  [place] [address]      RE: Zack Alas       Dear Colleague,    I had the pleasure of seeing Zack Alas in the [facility].    Service Date: 05/02/2018      HISTORY OF PRESENT ILLNESS:  Adan is a very nice 62-year-old gentleman who is an ex-neighbor of [facility].  He moved out of the neighborhood to [place], [place] in 2003, but still comes back for his health care.  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        He has a very strong family history of coronary artery disease.  He has hypercholesterolemia.  A kidney stone led to a CT scan demonstrating a lot of calcium buildup around his arteries.  A calcium score came back at 482, putting him in the 90%- 95th percentile for a man of his age.  Most of the calcium was in the left anterior descending artery, right coronary artery and left main.  He subsequently underwent a stress echo which appeared to demonstrate no evidence of ischemia.      Adan returns to clinic stating he thinks he is doing quite well.  He has no exertional chest, arm, neck, jaw or shoulder discomfort.  No dyspnea on exertion, orthopnea or PND.  No palpitations, lightheadedness, dizziness, syncope or near-syncope.  He is planning on starting a very aggressive training program as he wants to do a 100-mile ride in the [place] and so he is concerned about how hard he can push himself, especially at high elevations.  He currently states he tries to exercise about 7 times a week but has not been doing any intensive biking.  He is started on a cleanse and a special diet.  He notes no side effects or problems with his current medical regimen.      ASSESSMENT AND PLAN:  Adan appears to be well from a cardiac standpoint without  clinical evidence of ischemia and this is supported by his stress test of 2017.  Given the fact that he is going to start an intensive training program and is concerned about doing this at high elevation with his very high calcium score, we will run a repeat stress test this year, just to make sure it is safe to do so and I will set him up for a stress echocardiogram.      I congratulated him on his exercise regimen and encouraged him to continue to do aerobic activity at least 5 times a week and resistance activity 2-3 times a week.      We talked about the importance of a diet that is high in fresh fruits and vegetables, high fiber, whole grain, low in red meat, low in dairy products.      Fasting lipid profile this year is still good with a total cholesterol of 141, HDL of 53, LDL of 69, triglycerides of 95.  Again, I have told him if he exercises consistently, lose 5-10 pounds and tighten up his diet, his cholesterol profile will continue to get better.  We will continue with the Crestor 40 mg daily.  He also is on a baby dose aspirin once a day.      Blood pressure is well controlled at 130/82.  Weight is 195 pounds, giving a body mass index of 28.1.      We will follow up on the stress test.  Otherwise, I will plan on seeing her back in 1 year.  If he should have any problems, I will be glad to see him sooner.      He states his sister has found some genetic testing and I told him if he is interested in this, we will set him up with Cara Norris for some genetic counseling; at this time he is not interested in pursuing this.      Thank you for allowing me to participate in his care.         MIKE YOUNGBLOOD MD, [facility]             D: 2018   T: 2018   MT: NASH      Name:     REINIER AN   MRN:      [id number]        Account:      [id number]   :      1955           Service Date: 2018      Document: [id number]         Outpatient Encounter Prescriptions as of 2018   Medication  Sig Dispense Refill     ASPIRIN ADULT LOW STRENGTH PO Take 81 mg by mouth daily       cephALEXin (KEFLEX) 500 MG capsule Take 500 mg by mouth 3 times daily       rosuvastatin (CRESTOR) 40 MG tablet Take 1 tablet (40 mg) by mouth daily 90 tablet 0     No facility-administered encounter medications on file as of 5/2/2018.          Again, thank you for allowing me to participate in the care of your patient.      Sincerely,    Swapnil House MD     [facility]

## 2020-01-06 ENCOUNTER — TRANSFERRED RECORDS (OUTPATIENT)
Dept: HEALTH INFORMATION MANAGEMENT | Facility: CLINIC | Age: 65
End: 2020-01-06

## 2020-01-06 LAB
CHOLEST SERPL-MCNC: 156 MG/DL
HDLC SERPL-MCNC: 60 MG/DL
LDLC SERPL CALC-MCNC: 68 MG/DL
NONHDLC SERPL-MCNC: NORMAL MG/DL
TRIGL SERPL-MCNC: 141 MG/DL

## 2020-03-04 ENCOUNTER — PRE VISIT (OUTPATIENT)
Dept: CARDIOLOGY | Facility: CLINIC | Age: 65
End: 2020-03-04

## 2020-03-04 ENCOUNTER — DOCUMENTATION ONLY (OUTPATIENT)
Dept: CARDIOLOGY | Facility: CLINIC | Age: 65
End: 2020-03-04

## 2020-03-04 DIAGNOSIS — R93.1 HIGH CORONARY ARTERY CALCIUM SCORE: Primary | Chronic | ICD-10-CM

## 2020-03-04 DIAGNOSIS — K20.0 EOSINOPHILIC ESOPHAGITIS: ICD-10-CM

## 2020-03-04 NOTE — PROGRESS NOTES
Message from Dr. 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 He now lives in [place].  He is having some vague symptoms and wants to come back to see me.  He will fly back. See if we can set him up for a stress echo, bmp, flip with ov with me the following day. OK to use locked spot.     Contact info is cell [phone]   [email or web address]     Order placed for OV, stress echo and labs.  Message to scheduling to contact patient

## 2020-03-05 ENCOUNTER — MYC MEDICAL ADVICE (OUTPATIENT)
Dept: CARDIOLOGY | Facility: CLINIC | Age: 65
End: 2020-03-05

## 2020-03-06 NOTE — TELEPHONE ENCOUNTER
My chart message from patient:  I've attached new labs from January 2020, can you please include them in my charts.     Thanks   Adan     Lipids entered and records sent to scan

## 2020-03-12 ENCOUNTER — HOSPITAL ENCOUNTER (OUTPATIENT)
Dept: CARDIOLOGY | Facility: CLINIC | Age: 65
Discharge: HOME | End: 2020-03-12
Attending: INTERNAL MEDICINE | Admitting: INTERNAL MEDICINE
Payer: COMMERCIAL

## 2020-03-12 DIAGNOSIS — R93.1 HIGH CORONARY ARTERY CALCIUM SCORE: Chronic | ICD-10-CM

## 2020-03-12 LAB
ALT SERPL W P-5'-P-CCNC: 27 U/L (ref 5–30)
ANION GAP SERPL CALCULATED.3IONS-SCNC: 9.3 MMOL/L (ref 6–17)
BUN SERPL-MCNC: 13 MG/DL (ref 7–30)
CALCIUM SERPL-MCNC: 9.5 MG/DL (ref 8.5–10.5)
CHLORIDE SERPL-SCNC: 105 MMOL/L (ref 98–107)
CHOLEST SERPL-MCNC: 141 MG/DL
CO2 SERPL-SCNC: 29 MMOL/L (ref 23–29)
CREAT SERPL-MCNC: 1.1 MG/DL (ref 0.7–1.3)
GFR SERPL CREATININE-BSD FRML MDRD: 68 ML/MIN/{1.73_M2}
GLUCOSE SERPL-MCNC: 102 MG/DL (ref 70–105)
HDLC SERPL-MCNC: 58 MG/DL
LDLC SERPL CALC-MCNC: 53 MG/DL
NONHDLC SERPL-MCNC: 83 MG/DL
POTASSIUM SERPL-SCNC: 4.3 MMOL/L (ref 3.5–5.1)
SODIUM SERPL-SCNC: 139 MMOL/L (ref 136–145)
TRIGL SERPL-MCNC: 152 MG/DL

## 2020-03-12 PROCEDURE — 93016 CV STRESS TEST SUPVJ ONLY: CPT | Performed by: INTERNAL MEDICINE

## 2020-03-12 PROCEDURE — 93018 CV STRESS TEST I&R ONLY: CPT | Performed by: INTERNAL MEDICINE

## 2020-03-12 PROCEDURE — 93321 DOPPLER ECHO F-UP/LMTD STD: CPT | Mod: TC

## 2020-03-12 PROCEDURE — 80061 LIPID PANEL: CPT | Performed by: INTERNAL MEDICINE

## 2020-03-12 PROCEDURE — 80048 BASIC METABOLIC PNL TOTAL CA: CPT | Performed by: INTERNAL MEDICINE

## 2020-03-12 PROCEDURE — 84460 ALANINE AMINO (ALT) (SGPT): CPT | Performed by: INTERNAL MEDICINE

## 2020-03-12 PROCEDURE — 36415 COLL VENOUS BLD VENIPUNCTURE: CPT | Performed by: INTERNAL MEDICINE

## 2020-03-12 PROCEDURE — 93321 DOPPLER ECHO F-UP/LMTD STD: CPT | Mod: 26 | Performed by: INTERNAL MEDICINE

## 2020-03-12 PROCEDURE — 93350 STRESS TTE ONLY: CPT | Mod: 26 | Performed by: INTERNAL MEDICINE

## 2020-03-12 PROCEDURE — 93325 DOPPLER ECHO COLOR FLOW MAPG: CPT | Mod: 26 | Performed by: INTERNAL MEDICINE

## 2020-03-13 ENCOUNTER — OFFICE VISIT (OUTPATIENT)
Dept: CARDIOLOGY | Facility: CLINIC | Age: 65
End: 2020-03-13
Attending: INTERNAL MEDICINE
Payer: COMMERCIAL

## 2020-03-13 VITALS
HEIGHT: 70 IN | BODY MASS INDEX: 29.2 KG/M2 | WEIGHT: 204 LBS | SYSTOLIC BLOOD PRESSURE: 110 MMHG | DIASTOLIC BLOOD PRESSURE: 82 MMHG | HEART RATE: 92 BPM

## 2020-03-13 DIAGNOSIS — E78.2 MIXED HYPERLIPIDEMIA: Primary | ICD-10-CM

## 2020-03-13 DIAGNOSIS — R07.89 CHEST DISCOMFORT: ICD-10-CM

## 2020-03-13 DIAGNOSIS — Z82.49 FAMILY HISTORY OF CORONARY ARTERY DISEASE: ICD-10-CM

## 2020-03-13 DIAGNOSIS — R93.1 HIGH CORONARY ARTERY CALCIUM SCORE: Chronic | ICD-10-CM

## 2020-03-13 PROCEDURE — 99214 OFFICE O/P EST MOD 30 MIN: CPT | Performed by: INTERNAL MEDICINE

## 2020-03-13 ASSESSMENT — MIFFLIN-ST. JEOR: SCORE: 1731.59

## 2020-03-13 NOTE — LETTER
3/13/2020    ALEKSANDAR PEGGY HERNÁN  [facility] [address]  [place] [address]    RE: Zack Alas       Dear Colleague,    I had the pleasure of seeing Zack Alas in the [facility].    HPI and Plan:   See dictation    No orders of the defined types were placed in this encounter.      No orders of the defined types were placed in this encounter.      There are no discontinued medications.      Encounter Diagnoses   Name Primary?     Chest discomfort      High coronary artery calcium score      Mixed hyperlipidemia Yes     Family history of coronary artery disease        CURRENT MEDICATIONS:  Current Outpatient Medications   Medication Sig Dispense Refill     ASPIRIN ADULT LOW STRENGTH PO Take 81 mg by mouth daily       rosuvastatin (CRESTOR) 40 MG tablet Take 1 tablet (40 mg) by mouth daily 90 tablet 3     cephALEXin (KEFLEX) 500 MG capsule Take 500 mg by mouth 3 times daily         ALLERGIES   No Known Allergies    PAST MEDICAL HISTORY:  Past Medical History:   Diagnosis Date     Eosinophilic esophagitis      Family history of coronary artery disease      High coronary artery calcium score 5/2/2018    CT calcium score 2017: total=482 / 90-95th percentile     Pure hypercholesterolemia 4/19/2017       PAST SURGICAL HISTORY:  History reviewed. No pertinent surgical history.    FAMILY HISTORY:  Family History   Problem Relation Age of Onset     Diabetes Mother      Chronic Obstructive Pulmonary Disease Mother      Hypertension Father      Prostate Cancer Father      Kidney Cancer Father      Bladder Cancer Father        SOCIAL HISTORY:  Social History     Socioeconomic History     Marital status: Single     Spouse name: None     Number of children: None     Years of education: None     Highest education level: None   Occupational History     None   Social Needs     Financial resource strain: None     Food insecurity     Worry: None     Inability: None      "Transportation needs     Medical: None     Non-medical: None   Tobacco Use     Smoking status: Never Smoker     Smokeless tobacco: Never Used   Substance and Sexual Activity     Alcohol use: Yes     Comment: couple cocktails a week     Drug use: None     Sexual activity: None   Lifestyle     Physical activity     Days per week: None     Minutes per session: None     Stress: None   Relationships     Social connections     Talks on phone: None     Gets together: None     Attends Advent service: None     Active member of club or organization: None     Attends meetings of clubs or organizations: None     Relationship status: None     Intimate partner violence     Fear of current or ex partner: None     Emotionally abused: None     Physically abused: None     Forced sexual activity: None   Other Topics Concern     Parent/sibling w/ CABG, MI or angioplasty before 65F 55M? Not Asked   Social History Narrative     None       Review of Systems:  Skin:  Negative       Eyes:  Positive for glasses reading glasses and cataract surgery-left eye 2 years ago  ENT:  Positive for tinnitus;hearing loss    Respiratory:  Negative       Cardiovascular:    chest pain;Positive for;dizziness;lightheadedness chest \"pressure\" intermittently, lightheadedness and dizziness come with stressful situations  Gastroenterology: Positive for heartburn    Genitourinary:  Negative      Musculoskeletal:  Negative      Neurologic:  Positive for numbness or tingling of hands both arms  Psychiatric:  Positive for excessive stress job related  Heme/Lymph/Imm:  Negative      Endocrine:  Negative        Physical Exam:  Vitals: /82   Pulse 92   Ht 1.778 m (5' 10\")   Wt 92.5 kg (204 lb)   BMI 29.27 kg/m      Constitutional:  cooperative, alert and oriented, well developed, well nourished, in no acute distress        Skin:  warm and dry to the touch, no apparent skin lesions or masses noted          Head:  normocephalic, no masses or lesions    "     Eyes:  pupils equal and round, conjunctivae and lids unremarkable, sclera white, no xanthalasma, EOMS intact, no nystagmus        Lymph:      ENT:  no pallor or cyanosis, dentition good        Neck:  carotid pulses are full and equal bilaterally;no carotid bruit        Respiratory:            Cardiac: regular rhythm;normal S1 and S2;no S3 or S4;no murmurs, gallops or rubs detected                pulses full and equal                                        GI:           Extremities and Muscular Skeletal:  no edema;no spinal abnormalities noted;normal muscle strength and tone              Neurological:  no gross motor deficits        Psych:  affect appropriate, oriented to time, person and place        CC  Swapnil House MD  [id number]  [place] [address]                Thank you for allowing me to participate in the care of your patient.      Sincerely,     Swapnil House MD     [facility]    cc:   Swapnil House MD  [address]  [place] [address]

## 2020-03-13 NOTE — LETTER
3/13/2020      ALEKSANDAR PEGGY HERNÁN  [facility] [address]  [place] [address]      RE: Zack Alas       Dear Colleague,    I had the pleasure of seeing Zack Alas in the [facility].    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  He now lives in [place], for the most part, but is currently working in the [facility] area.  He is living in [place] but actually up to October was in [place], the epicenter of the [place].  He currently is doing most of his work in the [place] area and is undergoing a very stressful intense project out there.  He states he is working very long hours.  He is not exercising.  He is not eating right and states he thinks this is getting to him.      He has been experiencing chest pressure and chest tightness, although this tends to occur more at periods of rest.  He states he has been feeling dizzy, a bit lightheaded.  He has got tingling in his hands at times and is quite concerned and wants to return for followup and evaluation.      As stated, he has got a very strong family history of coronary artery disease.  He has hypercholesterolemia.  A kidney stone led to a CT scan demonstrating a lot of calcium around his arteries.  Subsequent calcium score came back at 482, putting him in the 90th-95th percentile for a man of his age.  Most of the calcium was in the left anterior descending artery, right coronary artery and left main.  Stress testing has been normal.        Usually, Adan is quite good about exercising and watching his diet but states that has not been the case in the last several months.      ASSESSMENT AND PLAN:  Adan has chest discomfort that I suspect is mostly stress, as it is not at all associated with his physical activity.  We did a stress echo on him yesterday for which he was able to exercise almost 9 minutes  of the standard Chandan protocol.  He states he could have gone further.  He did not develop any symptoms, and the stress echo appears to be normal.      Blood pressure is very well controlled at 110/82 with a pulse of 92.      Weight is 204 pounds, which unfortunately is up, giving him a body mass index of 29.3.  Two years ago he weighed 195, and 3 years ago he weighed 192, so he has gained 12 pounds over the last 3 years.  He thinks most of that occurred in the last several months.      This reflects in his cholesterol profile, which is still quite good.  Total cholesterol is 141, HDL is 58, LDL is 53, triglycerides are 152, his sugars are 102.  Again, we talked about the fact that he is eating poorly.  As a matter of fact, he states oftentimes to get him through the day he will drink a real Coke and realizes that is a sugar load and he probably shouldn't be doing it, but this is probably manifesting in his hypertriglyceridemia.  We talked about just tightening up his carbohydrates, trying to get back to his regular exercise and getting the weight off, and this will probably take care of his triglycerides.  Otherwise, I will just continue him on his rosuvastatin 40.  Given his high calcium score and family history, I am going to continue his low-dose aspirin as I think he is low risk for bleeding.      Basic metabolic profile is normal, other than the glucose which is at the upper limits of normal.  We had a nice discussion.  He is in agreement that he thinks this is most likely stress.  He is going to address lifestyle.  He will follow up on a p.r.n. basis.         MIKE YOUNGBLOOD MD, [facility]       D: 2020   T: 2020   MT: MAGALY      Name:     REINIER AN   MRN:      [id number]        Account:      [id number]   :      1955           Service Date: 2020      Document: [id number]      Outpatient Encounter Medications as of 3/13/2020   Medication Sig Dispense Refill     ASPIRIN ADULT LOW  STRENGTH PO Take 81 mg by mouth daily       rosuvastatin (CRESTOR) 40 MG tablet Take 1 tablet (40 mg) by mouth daily 90 tablet 3     cephALEXin (KEFLEX) 500 MG capsule Take 500 mg by mouth 3 times daily       No facility-administered encounter medications on file as of 3/13/2020.      Again, thank you for allowing me to participate in the care of your patient.      Sincerely,    Swapnil House MD     [facility]

## 2020-03-13 NOTE — PROGRESS NOTES
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  He now lives in [place], for the most part, but is currently working in the [facility] area.  He is living in [place] but actually up to October was in [place], the epicenter of the coronavirus.  He currently is doing most of his work in the [place] area and is undergoing a very stressful intense project out there.  He states he is working very long hours.  He is not exercising.  He is not eating right and states he thinks this is getting to him.      He has been experiencing chest pressure and chest tightness, although this tends to occur more at periods of rest.  He states he has been feeling dizzy, a bit lightheaded.  He has got tingling in his hands at times and is quite concerned and wants to return for followup and evaluation.      As stated, he has got a very strong family history of coronary artery disease.  He has hypercholesterolemia.  A kidney stone led to a CT scan demonstrating a lot of calcium around his arteries.  Subsequent calcium score came back at 482, putting him in the 90th-95th percentile for a man of his age.  Most of the calcium was in the left anterior descending artery, right coronary artery and left main.  Stress testing has been normal.        Usually, Adan is quite good about exercising and watching his diet but states that has not been the case in the last several months.      ASSESSMENT AND PLAN:  Adan has chest discomfort that I suspect is mostly stress, as it is not at all associated with his physical activity.  We did a stress echo on him yesterday for which he was able to exercise almost 9 minutes of the standard Chandan protocol.  He states he could have gone further.  He did not develop any symptoms, and the stress echo appears to be normal.      Blood pressure is very well controlled at 110/82 with a pulse of 92.      Weight is 204 pounds,  which unfortunately is up, giving him a body mass index of 29.3.  Two years ago he weighed 195, and 3 years ago he weighed 192, so he has gained 12 pounds over the last 3 years.  He thinks most of that occurred in the last several months.      This reflects in his cholesterol profile, which is still quite good.  Total cholesterol is 141, HDL is 58, LDL is 53, triglycerides are 152, his sugars are 102.  Again, we talked about the fact that he is eating poorly.  As a matter of fact, he states oftentimes to get him through the day he will drink a real Coke and realizes that is a sugar load and he probably shouldn't be doing it, but this is probably manifesting in his hypertriglyceridemia.  We talked about just tightening up his carbohydrates, trying to get back to his regular exercise and getting the weight off, and this will probably take care of his triglycerides.  Otherwise, I will just continue him on his rosuvastatin 40.  Given his high calcium score and family history, I am going to continue his low-dose aspirin as I think he is low risk for bleeding.      Basic metabolic profile is normal, other than the glucose which is at the upper limits of normal.  We had a nice discussion.  He is in agreement that he thinks this is most likely stress.  He is going to address lifestyle.  He will follow up on a p.r.n. basis.         MIKE YOUNGBLOOD MD, [facility]             D: 2020   T: 2020   MT: MAGALY      Name:     REINIER AN   MRN:      [id number]        Account:      [id number]   :      1955           Service Date: 2020      Document: [id number]

## 2020-03-13 NOTE — PROGRESS NOTES
HPI and Plan:   See dictation    No orders of the defined types were placed in this encounter.      No orders of the defined types were placed in this encounter.      There are no discontinued medications.      Encounter Diagnoses   Name Primary?     Chest discomfort      High coronary artery calcium score      Mixed hyperlipidemia Yes     Family history of coronary artery disease        CURRENT MEDICATIONS:  Current Outpatient Medications   Medication Sig Dispense Refill     ASPIRIN ADULT LOW STRENGTH PO Take 81 mg by mouth daily       rosuvastatin (CRESTOR) 40 MG tablet Take 1 tablet (40 mg) by mouth daily 90 tablet 3     cephALEXin (KEFLEX) 500 MG capsule Take 500 mg by mouth 3 times daily         ALLERGIES   No Known Allergies    PAST MEDICAL HISTORY:  Past Medical History:   Diagnosis Date     Eosinophilic esophagitis      Family history of coronary artery disease      High coronary artery calcium score 5/2/2018    CT calcium score 2017: total=482 / 90-95th percentile     Pure hypercholesterolemia 4/19/2017       PAST SURGICAL HISTORY:  History reviewed. No pertinent surgical history.    FAMILY HISTORY:  Family History   Problem Relation Age of Onset     Diabetes Mother      Chronic Obstructive Pulmonary Disease Mother      Hypertension Father      Prostate Cancer Father      Kidney Cancer Father      Bladder Cancer Father        SOCIAL HISTORY:  Social History     Socioeconomic History     Marital status: Single     Spouse name: None     Number of children: None     Years of education: None     Highest education level: None   Occupational History     None   Social Needs     Financial resource strain: None     Food insecurity     Worry: None     Inability: None     Transportation needs     Medical: None     Non-medical: None   Tobacco Use     Smoking status: Never Smoker     Smokeless tobacco: Never Used   Substance and Sexual Activity     Alcohol use: Yes     Comment: couple cocktails a week     Drug use: None  "    Sexual activity: None   Lifestyle     Physical activity     Days per week: None     Minutes per session: None     Stress: None   Relationships     Social connections     Talks on phone: None     Gets together: None     Attends Oriental orthodox service: None     Active member of club or organization: None     Attends meetings of clubs or organizations: None     Relationship status: None     Intimate partner violence     Fear of current or ex partner: None     Emotionally abused: None     Physically abused: None     Forced sexual activity: None   Other Topics Concern     Parent/sibling w/ CABG, MI or angioplasty before 65F 55M? Not Asked   Social History Narrative     None       Review of Systems:  Skin:  Negative       Eyes:  Positive for glasses reading glasses and cataract surgery-left eye 2 years ago  ENT:  Positive for tinnitus;hearing loss    Respiratory:  Negative       Cardiovascular:    chest pain;Positive for;dizziness;lightheadedness chest \"pressure\" intermittently, lightheadedness and dizziness come with stressful situations  Gastroenterology: Positive for heartburn    Genitourinary:  Negative      Musculoskeletal:  Negative      Neurologic:  Positive for numbness or tingling of hands both arms  Psychiatric:  Positive for excessive stress job related  Heme/Lymph/Imm:  Negative      Endocrine:  Negative        Physical Exam:  Vitals: /82   Pulse 92   Ht 1.778 m (5' 10\")   Wt 92.5 kg (204 lb)   BMI 29.27 kg/m      Constitutional:  cooperative, alert and oriented, well developed, well nourished, in no acute distress        Skin:  warm and dry to the touch, no apparent skin lesions or masses noted          Head:  normocephalic, no masses or lesions        Eyes:  pupils equal and round, conjunctivae and lids unremarkable, sclera white, no xanthalasma, EOMS intact, no nystagmus        Lymph:      ENT:  no pallor or cyanosis, dentition good        Neck:  carotid pulses are full and equal bilaterally;no " carotid bruit        Respiratory:            Cardiac: regular rhythm;normal S1 and S2;no S3 or S4;no murmurs, gallops or rubs detected                pulses full and equal                                        GI:           Extremities and Muscular Skeletal:  no edema;no spinal abnormalities noted;normal muscle strength and tone              Neurological:  no gross motor deficits        Psych:  affect appropriate, oriented to time, person and place        CC  Swapnil House MD  [id number]  [place] [address]

## 2020-05-27 ENCOUNTER — MYC REFILL (OUTPATIENT)
Dept: CARDIOLOGY | Facility: CLINIC | Age: 65
End: 2020-05-27

## 2020-05-27 DIAGNOSIS — I25.10 CORONARY ARTERY DISEASE INVOLVING NATIVE CORONARY ARTERY OF NATIVE HEART WITHOUT ANGINA PECTORIS: ICD-10-CM

## 2020-05-27 DIAGNOSIS — E78.2 MIXED HYPERLIPIDEMIA: ICD-10-CM

## 2020-05-27 RX ORDER — ROSUVASTATIN CALCIUM 40 MG/1
40 TABLET, COATED ORAL DAILY
Qty: 90 TABLET | Refills: 3 | Status: SHIPPED | OUTPATIENT
Start: 2020-05-27

## 2021-01-15 ENCOUNTER — HEALTH MAINTENANCE LETTER (OUTPATIENT)
Age: 66
End: 2021-01-15

## 2021-09-04 ENCOUNTER — HEALTH MAINTENANCE LETTER (OUTPATIENT)
Age: 66
End: 2021-09-04

## 2022-02-19 ENCOUNTER — HEALTH MAINTENANCE LETTER (OUTPATIENT)
Age: 67
End: 2022-02-19

## 2022-10-22 ENCOUNTER — HEALTH MAINTENANCE LETTER (OUTPATIENT)
Age: 67
End: 2022-10-22

## 2023-04-01 ENCOUNTER — HEALTH MAINTENANCE LETTER (OUTPATIENT)
Age: 68
End: 2023-04-01

## 2023-10-06 NOTE — TELEPHONE ENCOUNTER
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 He flies back routinely as needed for his medical care and PMD is in the local [facility] system. Patient had a recent kidney stone and scans showed some cardiac plaque. He followed up with a CT calcium score. He was messaged that his results were \"significant\" and was recommended for a possible stress test with a [facility] cardiologist. Patient would like to follow with Dr. House. He has an extensive family history of CAD. His only other known cardiac testing is a stress test done in [place] 10 years ago. He will try to find that information. Patient states he feels well at this time, no chest discomfort, dyspnea or fatigue.  Faxed request for records to PMD.   Will message Dr. House for a work-in appointment.  Patient is in the [facility] on April 13 and 14. He is in the area often for work.  " Gabapentin Counseling: I discussed with the patient the risks of gabapentin including but not limited to dizziness, somnolence, fatigue and ataxia.